# Patient Record
Sex: FEMALE | Race: WHITE | NOT HISPANIC OR LATINO | Employment: FULL TIME | URBAN - METROPOLITAN AREA
[De-identification: names, ages, dates, MRNs, and addresses within clinical notes are randomized per-mention and may not be internally consistent; named-entity substitution may affect disease eponyms.]

---

## 2017-12-19 ENCOUNTER — OFFICE VISIT (OUTPATIENT)
Dept: URGENT CARE | Facility: CLINIC | Age: 37
End: 2017-12-19
Payer: COMMERCIAL

## 2017-12-19 VITALS
DIASTOLIC BLOOD PRESSURE: 90 MMHG | HEIGHT: 63 IN | OXYGEN SATURATION: 98 % | WEIGHT: 140 LBS | SYSTOLIC BLOOD PRESSURE: 130 MMHG | TEMPERATURE: 97.6 F | RESPIRATION RATE: 16 BRPM | BODY MASS INDEX: 24.8 KG/M2 | HEART RATE: 72 BPM

## 2017-12-19 DIAGNOSIS — L03.012 PARONYCHIA OF LEFT INDEX FINGER: ICD-10-CM

## 2017-12-19 PROCEDURE — 99203 OFFICE O/P NEW LOW 30 MIN: CPT | Performed by: PHYSICIAN ASSISTANT

## 2017-12-19 RX ORDER — SULFAMETHOXAZOLE AND TRIMETHOPRIM 800; 160 MG/1; MG/1
1 TABLET ORAL EVERY 12 HOURS
Qty: 20 TAB | Refills: 0 | Status: SHIPPED | OUTPATIENT
Start: 2017-12-19 | End: 2017-12-29

## 2017-12-19 ASSESSMENT — ENCOUNTER SYMPTOMS
FEVER: 0
TINGLING: 0
VOMITING: 0
SENSORY CHANGE: 0
BRUISES/BLEEDS EASILY: 0
CHILLS: 0
NAUSEA: 0

## 2017-12-19 NOTE — PROGRESS NOTES
"  Subjective:     Ann Lugo is a 37 y.o. female who presents for Finger Pain (xsaturday, left index finger possible infection, swollen, redness)       Last 7-10d of mild swelling, redness and pressure pain to left index, adjacent to nail margin, PMH of using fake nails and freq chewing of base of nail. Notes this fake nail was removed just prior to onset s/sx. Denies fever/chills/drainage. Denies PMH of similar. States she has been diligent w/ warm epsom soaks but this still has worsening pain.        Other   This is a new problem. The current episode started in the past 7 days (3d). The problem has been gradually worsening. Pertinent negatives include no chills, fever, nausea, rash or vomiting.   History reviewed. No pertinent past medical history.History reviewed. No pertinent surgical history.  Social History     Social History   • Marital status:      Spouse name: N/A   • Number of children: N/A   • Years of education: N/A     Occupational History   • Not on file.     Social History Main Topics   • Smoking status: Never Smoker   • Smokeless tobacco: Never Used   • Alcohol use No      Comment: occass   • Drug use: No   • Sexual activity: Not on file     Other Topics Concern   • Not on file     Social History Narrative   • No narrative on file    History reviewed. No pertinent family history. Review of Systems   Constitutional: Negative for chills and fever.   Gastrointestinal: Negative for nausea and vomiting.   Musculoskeletal: Positive for joint pain ( pos for swelling and pain to left index tip).   Skin: Negative for rash.   Neurological: Negative for tingling and sensory change.   Endo/Heme/Allergies: Does not bruise/bleed easily.     Allergies   Allergen Reactions   • Nkda [No Known Drug Allergy]       Objective:   /90   Pulse 72   Temp 36.4 °C (97.6 °F)   Resp 16   Ht 1.6 m (5' 3\")   Wt 63.5 kg (140 lb)   SpO2 98%   BMI 24.80 kg/m²   Physical Exam   Constitutional: She is " oriented to person, place, and time. She appears well-developed and well-nourished. No distress.   HENT:   Head: Normocephalic and atraumatic.   Right Ear: External ear normal.   Left Ear: External ear normal.   Nose: Nose normal.   Eyes: Conjunctivae and lids are normal. Right eye exhibits no discharge. Left eye exhibits no discharge. No scleral icterus.   Neck: Neck supple.   Pulmonary/Chest: Effort normal. No respiratory distress.   Musculoskeletal: Normal range of motion.        Hands:  Ulnar aspect of left index nail margin w/ very mild erythema and edema, no fluctuance, non pointing, non ulcerative/vesicular, normal sensation c/w mild paronychia   Neurological: She is alert and oriented to person, place, and time. She is not disoriented.   Skin: Skin is warm and dry. She is not diaphoretic. No erythema. No pallor.   Psychiatric: Her speech is normal and behavior is normal.   Nursing note and vitals reviewed.        Assessment/Plan:   Assessment    1. Paronychia of left index finger  Supportive care is reviewed with patient/caregiver - I review (multiple times) w/ pt tx plan of continuing epsom soaks, this does not warrant I&D today w/ no focal cystic structure, if pressure pain is worsening despite 15min soaks 4-5 x / day, then fill and take abx,  take full course of Rx, take with probiotics, observe for resolution  Return to clinic with lack of resolution or progression of symptoms.    - sulfamethoxazole-trimethoprim (BACTRIM DS) 800-160 MG tablet; Take 1 Tab by mouth every 12 hours for 10 days.  Dispense: 20 Tab; Refill: 0

## 2018-10-22 ENCOUNTER — OFFICE VISIT (OUTPATIENT)
Dept: URGENT CARE | Facility: CLINIC | Age: 38
End: 2018-10-22
Payer: COMMERCIAL

## 2018-10-22 VITALS
TEMPERATURE: 99.7 F | DIASTOLIC BLOOD PRESSURE: 100 MMHG | WEIGHT: 140 LBS | HEART RATE: 68 BPM | BODY MASS INDEX: 24.8 KG/M2 | RESPIRATION RATE: 16 BRPM | OXYGEN SATURATION: 99 % | SYSTOLIC BLOOD PRESSURE: 130 MMHG | HEIGHT: 63 IN

## 2018-10-22 DIAGNOSIS — S61.201A OPEN WOUND OF LEFT INDEX FINGER WITHOUT DAMAGE TO NAIL, INITIAL ENCOUNTER: ICD-10-CM

## 2018-10-22 PROCEDURE — 12001 RPR S/N/AX/GEN/TRNK 2.5CM/<: CPT | Performed by: NURSE PRACTITIONER

## 2018-10-22 ASSESSMENT — ENCOUNTER SYMPTOMS
TINGLING: 0
SENSORY CHANGE: 0

## 2018-10-23 NOTE — PROGRESS NOTES
"Subjective:      Ann Lugo is a 38 y.o. female who presents with Laceration (index on left hand, today while cutting bread)            HPI New problem. 38 year old female with open wound to left index finger while slicing something at home. She has active bleeding from this area. No nail damage. NVS intact. Up to date on tetanus. No distal paresthesia.    Review of Systems   Skin:        Open wound finger.   Neurological: Negative for tingling and sensory change.          Objective:     /100 (BP Location: Left arm, Patient Position: Sitting, BP Cuff Size: Adult)   Pulse 68   Temp 37.6 °C (99.7 °F) (Temporal)   Resp 16   Ht 1.6 m (5' 3\")   Wt 63.5 kg (140 lb)   LMP 10/04/2018   SpO2 99%   Breastfeeding? No   BMI 24.80 kg/m²      Physical Exam   Constitutional: She is oriented to person, place, and time. She appears well-developed and well-nourished.   Musculoskeletal: Normal range of motion.   Neurological: She is alert and oriented to person, place, and time.   Skin: Skin is warm and dry.   approx one cm open wound to lateral tip of left 2nd digit.   Psychiatric: She has a normal mood and affect. Her behavior is normal. Thought content normal.   Nursing note and vitals reviewed.         Procedure: Laceration Repair  -Risks including bleeding, nerve damage, infection, and poor cosmetic outcome discussed at length. Benefits and alternatives discussed.   -Sterile technique throughout  -Local anesthesia with 2% lidocaine  -Closed with 2 #  4-0 Nylon interrupted sutures with good wound approximation  -Polysporin and dressing placed  -Patient tolerated well           Assessment/Plan:     1. Open wound of left index finger without damage to nail, initial encounter    RTC 7 days for removal  Wound care instructions to patient.  Ibuprofen 800 mg tid for pain.  Differential diagnosis, natural history, supportive care, and indications for immediate follow-up discussed at length.       "

## 2018-10-29 ENCOUNTER — OFFICE VISIT (OUTPATIENT)
Dept: URGENT CARE | Facility: CLINIC | Age: 38
End: 2018-10-29
Payer: COMMERCIAL

## 2018-10-29 VITALS
RESPIRATION RATE: 16 BRPM | HEART RATE: 62 BPM | BODY MASS INDEX: 24.8 KG/M2 | HEIGHT: 63 IN | DIASTOLIC BLOOD PRESSURE: 80 MMHG | SYSTOLIC BLOOD PRESSURE: 110 MMHG | TEMPERATURE: 97.5 F | OXYGEN SATURATION: 98 % | WEIGHT: 140 LBS

## 2018-10-29 DIAGNOSIS — Z48.02 VISIT FOR SUTURE REMOVAL: ICD-10-CM

## 2018-10-29 PROCEDURE — 99024 POSTOP FOLLOW-UP VISIT: CPT | Performed by: PHYSICIAN ASSISTANT

## 2018-10-30 NOTE — PROGRESS NOTES
"Subjective:      Ann Lugo is a 38 y.o. female who presents with Suture / Staple Removal (x 2 days, suture removal from Lt. index)          Pt is 39 y/o female who presents to  for suture removal- place on 10/22.       Suture / Staple Removal   Treated in ED: 10/22. There has been no drainage from the wound. There is no redness present. There is no swelling present. There is no pain present.       ROS    Denies any drainage, fevers, chills, or finger pain.   Also denies any numbness or tingling.      Objective:     /80 (BP Location: Left arm, Patient Position: Sitting, BP Cuff Size: Adult)   Pulse 62   Temp 36.4 °C (97.5 °F) (Temporal)   Resp 16   Ht 1.6 m (5' 3\")   Wt 63.5 kg (140 lb)   LMP 10/04/2018   SpO2 98%   BMI 24.80 kg/m²      Physical Exam       Left index finger- 2 sutures intact- well healing, approximated well. Without tenderness, noted edema, or swelling.        Assessment/Plan:     1. Visit for suture removal       Sutures removed by MA- without difficulty.   RTC PRN. Further wound care was discussed.     "

## 2019-11-20 ENCOUNTER — HOSPITAL ENCOUNTER (INPATIENT)
Facility: MEDICAL CENTER | Age: 39
LOS: 2 days | End: 2019-11-22
Attending: OBSTETRICS & GYNECOLOGY | Admitting: OBSTETRICS & GYNECOLOGY
Payer: COMMERCIAL

## 2019-11-20 ENCOUNTER — ANESTHESIA (OUTPATIENT)
Dept: ANESTHESIOLOGY | Facility: MEDICAL CENTER | Age: 39
End: 2019-11-20
Payer: COMMERCIAL

## 2019-11-20 ENCOUNTER — ANESTHESIA EVENT (OUTPATIENT)
Dept: ANESTHESIOLOGY | Facility: MEDICAL CENTER | Age: 39
End: 2019-11-20
Payer: COMMERCIAL

## 2019-11-20 LAB
ALBUMIN SERPL BCP-MCNC: 3.8 G/DL (ref 3.2–4.9)
ALBUMIN/GLOB SERPL: 1.3 G/DL
ALP SERPL-CCNC: 113 U/L (ref 30–99)
ALT SERPL-CCNC: 18 U/L (ref 2–50)
ANION GAP SERPL CALC-SCNC: 10 MMOL/L (ref 0–11.9)
AST SERPL-CCNC: 21 U/L (ref 12–45)
BASOPHILS # BLD AUTO: 0.3 % (ref 0–1.8)
BASOPHILS # BLD: 0.03 K/UL (ref 0–0.12)
BILIRUB SERPL-MCNC: 0.4 MG/DL (ref 0.1–1.5)
BUN SERPL-MCNC: 13 MG/DL (ref 8–22)
CALCIUM SERPL-MCNC: 9.2 MG/DL (ref 8.5–10.5)
CHLORIDE SERPL-SCNC: 103 MMOL/L (ref 96–112)
CO2 SERPL-SCNC: 23 MMOL/L (ref 20–33)
CREAT SERPL-MCNC: 0.79 MG/DL (ref 0.5–1.4)
EOSINOPHIL # BLD AUTO: 0.08 K/UL (ref 0–0.51)
EOSINOPHIL NFR BLD: 0.7 % (ref 0–6.9)
ERYTHROCYTE [DISTWIDTH] IN BLOOD BY AUTOMATED COUNT: 45.4 FL (ref 35.9–50)
ERYTHROCYTE [DISTWIDTH] IN BLOOD BY AUTOMATED COUNT: 45.6 FL (ref 35.9–50)
GLOBULIN SER CALC-MCNC: 2.9 G/DL (ref 1.9–3.5)
GLUCOSE SERPL-MCNC: 90 MG/DL (ref 65–99)
HCT VFR BLD AUTO: 36.4 % (ref 37–47)
HCT VFR BLD AUTO: 41.7 % (ref 37–47)
HGB BLD-MCNC: 12.6 G/DL (ref 12–16)
HGB BLD-MCNC: 14.2 G/DL (ref 12–16)
HOLDING TUBE BB 8507: NORMAL
IMM GRANULOCYTES # BLD AUTO: 0.07 K/UL (ref 0–0.11)
IMM GRANULOCYTES NFR BLD AUTO: 0.6 % (ref 0–0.9)
LYMPHOCYTES # BLD AUTO: 2.47 K/UL (ref 1–4.8)
LYMPHOCYTES NFR BLD: 22 % (ref 22–41)
MCH RBC QN AUTO: 32.1 PG (ref 27–33)
MCH RBC QN AUTO: 32.7 PG (ref 27–33)
MCHC RBC AUTO-ENTMCNC: 34.1 G/DL (ref 33.6–35)
MCHC RBC AUTO-ENTMCNC: 34.6 G/DL (ref 33.6–35)
MCV RBC AUTO: 94.3 FL (ref 81.4–97.8)
MCV RBC AUTO: 94.5 FL (ref 81.4–97.8)
MONOCYTES # BLD AUTO: 0.73 K/UL (ref 0–0.85)
MONOCYTES NFR BLD AUTO: 6.5 % (ref 0–13.4)
NEUTROPHILS # BLD AUTO: 7.83 K/UL (ref 2–7.15)
NEUTROPHILS NFR BLD: 69.9 % (ref 44–72)
NRBC # BLD AUTO: 0 K/UL
NRBC BLD-RTO: 0 /100 WBC
PLATELET # BLD AUTO: 137 K/UL (ref 164–446)
PLATELET # BLD AUTO: 178 K/UL (ref 164–446)
PMV BLD AUTO: 10.3 FL (ref 9–12.9)
PMV BLD AUTO: 10.7 FL (ref 9–12.9)
POTASSIUM SERPL-SCNC: 3.7 MMOL/L (ref 3.6–5.5)
PROT SERPL-MCNC: 6.7 G/DL (ref 6–8.2)
RBC # BLD AUTO: 3.85 M/UL (ref 4.2–5.4)
RBC # BLD AUTO: 4.42 M/UL (ref 4.2–5.4)
SODIUM SERPL-SCNC: 136 MMOL/L (ref 135–145)
WBC # BLD AUTO: 11.2 K/UL (ref 4.8–10.8)
WBC # BLD AUTO: 11.5 K/UL (ref 4.8–10.8)

## 2019-11-20 PROCEDURE — 303615 HCHG EPIDURAL/SPINAL ANESTHESIA FOR LABOR

## 2019-11-20 PROCEDURE — 0KQM0ZZ REPAIR PERINEUM MUSCLE, OPEN APPROACH: ICD-10-PCS | Performed by: OBSTETRICS & GYNECOLOGY

## 2019-11-20 PROCEDURE — 700111 HCHG RX REV CODE 636 W/ 250 OVERRIDE (IP)

## 2019-11-20 PROCEDURE — 700111 HCHG RX REV CODE 636 W/ 250 OVERRIDE (IP): Performed by: OBSTETRICS & GYNECOLOGY

## 2019-11-20 PROCEDURE — 700105 HCHG RX REV CODE 258: Performed by: OBSTETRICS & GYNECOLOGY

## 2019-11-20 PROCEDURE — 85025 COMPLETE CBC W/AUTO DIFF WBC: CPT

## 2019-11-20 PROCEDURE — 770002 HCHG ROOM/CARE - OB PRIVATE (112)

## 2019-11-20 PROCEDURE — A9270 NON-COVERED ITEM OR SERVICE: HCPCS | Performed by: OBSTETRICS & GYNECOLOGY

## 2019-11-20 PROCEDURE — 36415 COLL VENOUS BLD VENIPUNCTURE: CPT

## 2019-11-20 PROCEDURE — 85027 COMPLETE CBC AUTOMATED: CPT

## 2019-11-20 PROCEDURE — 700102 HCHG RX REV CODE 250 W/ 637 OVERRIDE(OP): Performed by: OBSTETRICS & GYNECOLOGY

## 2019-11-20 PROCEDURE — 80053 COMPREHEN METABOLIC PANEL: CPT

## 2019-11-20 PROCEDURE — 304965 HCHG RECOVERY SERVICES

## 2019-11-20 PROCEDURE — 59409 OBSTETRICAL CARE: CPT

## 2019-11-20 RX ORDER — ALUMINA, MAGNESIA, AND SIMETHICONE 2400; 2400; 240 MG/30ML; MG/30ML; MG/30ML
30 SUSPENSION ORAL EVERY 6 HOURS PRN
Status: DISCONTINUED | OUTPATIENT
Start: 2019-11-20 | End: 2019-11-20 | Stop reason: HOSPADM

## 2019-11-20 RX ORDER — ONDANSETRON 2 MG/ML
4 INJECTION INTRAMUSCULAR; INTRAVENOUS EVERY 6 HOURS PRN
Status: DISCONTINUED | OUTPATIENT
Start: 2019-11-20 | End: 2019-11-22 | Stop reason: HOSPADM

## 2019-11-20 RX ORDER — VITAMIN A ACETATE, BETA CAROTENE, ASCORBIC ACID, CHOLECALCIFEROL, .ALPHA.-TOCOPHEROL ACETATE, DL-, THIAMINE MONONITRATE, RIBOFLAVIN, NIACINAMIDE, PYRIDOXINE HYDROCHLORIDE, FOLIC ACID, CYANOCOBALAMIN, CALCIUM CARBONATE, FERROUS FUMARATE, ZINC OXIDE, CUPRIC OXIDE 3080; 12; 120; 400; 1; 1.84; 3; 20; 22; 920; 25; 200; 27; 10; 2 [IU]/1; UG/1; MG/1; [IU]/1; MG/1; MG/1; MG/1; MG/1; MG/1; [IU]/1; MG/1; MG/1; MG/1; MG/1; MG/1
1 TABLET, FILM COATED ORAL EVERY MORNING
Status: DISCONTINUED | OUTPATIENT
Start: 2019-11-21 | End: 2019-11-22 | Stop reason: HOSPADM

## 2019-11-20 RX ORDER — IBUPROFEN 600 MG/1
600 TABLET ORAL EVERY 6 HOURS PRN
Status: CANCELLED | OUTPATIENT
Start: 2019-11-20

## 2019-11-20 RX ORDER — ROPIVACAINE HYDROCHLORIDE 2 MG/ML
INJECTION, SOLUTION EPIDURAL; INFILTRATION; PERINEURAL CONTINUOUS
Status: DISCONTINUED | OUTPATIENT
Start: 2019-11-20 | End: 2019-11-20

## 2019-11-20 RX ORDER — OXYCODONE HYDROCHLORIDE AND ACETAMINOPHEN 5; 325 MG/1; MG/1
1 TABLET ORAL EVERY 4 HOURS PRN
Status: DISCONTINUED | OUTPATIENT
Start: 2019-11-20 | End: 2019-11-22 | Stop reason: HOSPADM

## 2019-11-20 RX ORDER — ROPIVACAINE HYDROCHLORIDE 2 MG/ML
INJECTION, SOLUTION EPIDURAL; INFILTRATION; PERINEURAL
Status: COMPLETED
Start: 2019-11-20 | End: 2019-11-20

## 2019-11-20 RX ORDER — SODIUM CHLORIDE, SODIUM LACTATE, POTASSIUM CHLORIDE, CALCIUM CHLORIDE 600; 310; 30; 20 MG/100ML; MG/100ML; MG/100ML; MG/100ML
INJECTION, SOLUTION INTRAVENOUS PRN
Status: DISCONTINUED | OUTPATIENT
Start: 2019-11-20 | End: 2019-11-22 | Stop reason: HOSPADM

## 2019-11-20 RX ORDER — DOCUSATE SODIUM 100 MG/1
100 CAPSULE, LIQUID FILLED ORAL 2 TIMES DAILY PRN
Status: DISCONTINUED | OUTPATIENT
Start: 2019-11-20 | End: 2019-11-22 | Stop reason: HOSPADM

## 2019-11-20 RX ORDER — SODIUM CHLORIDE, SODIUM LACTATE, POTASSIUM CHLORIDE, CALCIUM CHLORIDE 600; 310; 30; 20 MG/100ML; MG/100ML; MG/100ML; MG/100ML
INJECTION, SOLUTION INTRAVENOUS CONTINUOUS
Status: DISCONTINUED | OUTPATIENT
Start: 2019-11-20 | End: 2019-11-20

## 2019-11-20 RX ORDER — SODIUM CHLORIDE, SODIUM LACTATE, POTASSIUM CHLORIDE, AND CALCIUM CHLORIDE .6; .31; .03; .02 G/100ML; G/100ML; G/100ML; G/100ML
1000 INJECTION, SOLUTION INTRAVENOUS
Status: DISCONTINUED | OUTPATIENT
Start: 2019-11-20 | End: 2019-11-20 | Stop reason: HOSPADM

## 2019-11-20 RX ORDER — ONDANSETRON 4 MG/1
4 TABLET, ORALLY DISINTEGRATING ORAL EVERY 6 HOURS PRN
Status: DISCONTINUED | OUTPATIENT
Start: 2019-11-20 | End: 2019-11-22 | Stop reason: HOSPADM

## 2019-11-20 RX ORDER — IBUPROFEN 600 MG/1
600 TABLET ORAL EVERY 6 HOURS PRN
Status: DISCONTINUED | OUTPATIENT
Start: 2019-11-20 | End: 2019-11-22 | Stop reason: HOSPADM

## 2019-11-20 RX ORDER — MISOPROSTOL 200 UG/1
600 TABLET ORAL
Status: DISCONTINUED | OUTPATIENT
Start: 2019-11-20 | End: 2019-11-22 | Stop reason: HOSPADM

## 2019-11-20 RX ORDER — DEXTROSE, SODIUM CHLORIDE, SODIUM LACTATE, POTASSIUM CHLORIDE, AND CALCIUM CHLORIDE 5; .6; .31; .03; .02 G/100ML; G/100ML; G/100ML; G/100ML; G/100ML
INJECTION, SOLUTION INTRAVENOUS CONTINUOUS
Status: DISCONTINUED | OUTPATIENT
Start: 2019-11-20 | End: 2019-11-20

## 2019-11-20 RX ORDER — MISOPROSTOL 200 UG/1
800 TABLET ORAL
Status: DISCONTINUED | OUTPATIENT
Start: 2019-11-20 | End: 2019-11-20 | Stop reason: HOSPADM

## 2019-11-20 RX ORDER — OXYCODONE AND ACETAMINOPHEN 10; 325 MG/1; MG/1
1 TABLET ORAL EVERY 4 HOURS PRN
Status: DISCONTINUED | OUTPATIENT
Start: 2019-11-20 | End: 2019-11-22 | Stop reason: HOSPADM

## 2019-11-20 RX ORDER — OXYCODONE HYDROCHLORIDE AND ACETAMINOPHEN 5; 325 MG/1; MG/1
1 TABLET ORAL EVERY 4 HOURS PRN
Status: CANCELLED | OUTPATIENT
Start: 2019-11-20

## 2019-11-20 RX ORDER — SODIUM CHLORIDE, SODIUM LACTATE, POTASSIUM CHLORIDE, AND CALCIUM CHLORIDE .6; .31; .03; .02 G/100ML; G/100ML; G/100ML; G/100ML
250 INJECTION, SOLUTION INTRAVENOUS PRN
Status: DISCONTINUED | OUTPATIENT
Start: 2019-11-20 | End: 2019-11-20 | Stop reason: HOSPADM

## 2019-11-20 RX ADMIN — ROPIVACAINE HYDROCHLORIDE 200 MG: 2 INJECTION, SOLUTION EPIDURAL; INFILTRATION; PERINEURAL at 07:26

## 2019-11-20 RX ADMIN — Medication 125 ML/HR: at 10:04

## 2019-11-20 RX ADMIN — Medication 1000 ML/HR: at 08:50

## 2019-11-20 RX ADMIN — SODIUM CHLORIDE, POTASSIUM CHLORIDE, SODIUM LACTATE AND CALCIUM CHLORIDE: 600; 310; 30; 20 INJECTION, SOLUTION INTRAVENOUS at 07:44

## 2019-11-20 RX ADMIN — IBUPROFEN 600 MG: 600 TABLET ORAL at 20:55

## 2019-11-20 RX ADMIN — Medication 2 MILLI-UNITS/MIN: at 05:00

## 2019-11-20 RX ADMIN — SODIUM CHLORIDE, POTASSIUM CHLORIDE, SODIUM LACTATE AND CALCIUM CHLORIDE: 600; 310; 30; 20 INJECTION, SOLUTION INTRAVENOUS at 05:00

## 2019-11-20 RX ADMIN — IBUPROFEN 600 MG: 600 TABLET ORAL at 11:29

## 2019-11-20 RX ADMIN — ROPIVACAINE HYDROCHLORIDE 200 MG: 2 INJECTION, SOLUTION EPIDURAL; INFILTRATION at 07:26

## 2019-11-20 ASSESSMENT — PATIENT HEALTH QUESTIONNAIRE - PHQ9
SUM OF ALL RESPONSES TO PHQ9 QUESTIONS 1 AND 2: 0
2. FEELING DOWN, DEPRESSED, IRRITABLE, OR HOPELESS: NOT AT ALL
1. LITTLE INTEREST OR PLEASURE IN DOING THINGS: NOT AT ALL

## 2019-11-20 ASSESSMENT — LIFESTYLE VARIABLES
ALCOHOL_USE: NO
EVER FELT BAD OR GUILTY ABOUT YOUR DRINKING: NO
DOES PATIENT WANT TO STOP DRINKING: NO
EVER HAD A DRINK FIRST THING IN THE MORNING TO STEADY YOUR NERVES TO GET RID OF A HANGOVER: NO
AVERAGE NUMBER OF DAYS PER WEEK YOU HAVE A DRINK CONTAINING ALCOHOL: 0
TOTAL SCORE: 0
CONSUMPTION TOTAL: NEGATIVE
HOW MANY TIMES IN THE PAST YEAR HAVE YOU HAD 5 OR MORE DRINKS IN A DAY: 0
EVER_SMOKED: NEVER
TOTAL SCORE: 0
TOTAL SCORE: 0
HAVE PEOPLE ANNOYED YOU BY CRITICIZING YOUR DRINKING: NO
HAVE YOU EVER FELT YOU SHOULD CUT DOWN ON YOUR DRINKING: NO
ON A TYPICAL DAY WHEN YOU DRINK ALCOHOL HOW MANY DRINKS DO YOU HAVE: 0

## 2019-11-20 ASSESSMENT — COPD QUESTIONNAIRES
IN THE PAST 12 MONTHS DO YOU DO LESS THAN YOU USED TO BECAUSE OF YOUR BREATHING PROBLEMS: DISAGREE/UNSURE
DO YOU EVER COUGH UP ANY MUCUS OR PHLEGM?: NO/ONLY WITH OCCASIONAL COLDS OR INFECTIONS
DURING THE PAST 4 WEEKS HOW MUCH DID YOU FEEL SHORT OF BREATH: NONE/LITTLE OF THE TIME

## 2019-11-20 ASSESSMENT — PAIN SCALES - GENERAL: PAIN_LEVEL: 0

## 2019-11-20 NOTE — ANESTHESIA PROCEDURE NOTES
Epidural Block  Date/Time: 11/20/2019 7:26 AM  Performed by: Hank Madsen M.D.  Authorized by: Hank Madsen M.D.     Patient Location:  OB  Start Time:  11/20/2019 7:26 AM  End Time:  11/20/2019 7:28 AM  Reason for Block: labor analgesia    patient identified, IV checked, site marked, risks and benefits discussed, surgical consent, monitors and equipment checked, pre-op evaluation and timeout performed    Patient Position:  Sitting  Prep: ChloraPrep, patient draped and sterile technique    Monitoring:  Blood pressure, continuous pulse oximetry and heart rate  Approach:  Midline  Location:  L3-L4  Injection Technique:  EDMUNDO saline  Skin infiltration:  Lidocaine  Strength:  1%  Dose:  5ml  Needle Type:  Tuohy  Needle Gauge:  17 G  Needle Length:  3.5 in  Loss of resistance::  4  Catheter Size:  19 G  Catheter at Skin Depth:  9  Test Dose:  Lidocaine 1.5% with epinephrine 1-to-200,000  Test Dose Result:  Negative

## 2019-11-20 NOTE — ANESTHESIA POSTPROCEDURE EVALUATION
Patient: Ann Lugo    Procedure Summary     Date:  11/20/19 Room / Location:      Anesthesia Start:  0720 Anesthesia Stop:  0846    Procedure:  Labor Epidural Diagnosis:      Scheduled Providers:   Responsible Provider:  Hank Madsen M.D.    Anesthesia Type:  epidural ASA Status:  2          Final Anesthesia Type: epidural  Last vitals  BP   Blood Pressure: 122/78    Temp   37.2 °C (98.9 °F)    Pulse   Pulse: 69   Resp   18    SpO2   95 %      Anesthesia Post Evaluation    Patient location during evaluation: PACU  Patient participation: complete - patient participated  Level of consciousness: awake and alert  Pain score: 0    Airway patency: patent  Anesthetic complications: no  Cardiovascular status: hemodynamically stable  Respiratory status: acceptable  Hydration status: euvolemic    PONV: none

## 2019-11-20 NOTE — PROGRESS NOTES
"0700: Bedside report received from VIVEK Bran RN. Pt requesting epidural at this time, LR bolus started.     0723: Dr. Madsen at bedside to place epidural.     0734: SVE 4-5/100/0    0829: Pt states, \"I need to push.\" SVE Complete and +1 station.     0835: Dr. Sanabria updated with patient labor status.     0840: Dr. Sanabria at bedside for delivery.     0841: Pt began pushing.    0846: Infant delivered by Dr. Sanabria with 8/9 Apgars. 2 nd degree laceration noted with repair, and  mL.     0900: Straight catheter placed at this time, 150 mL urine output.     1120: Pt ambulated to bathroom at this time with the stand-by assistance of this RN. Pt voided large amount, joanie care done, tucks, pad and underwear in place.     1145: Pt transferred to S326 postpartum via wheelchair, report given to ARTUR Vásquez.         "

## 2019-11-20 NOTE — ANESTHESIA PREPROCEDURE EVALUATION
Term IUP.  Labor pain.   Relevant Problems   No relevant active problems     Vitals:    19 0459   BP: 134/73   Pulse: 75   Temp:      Recent Labs     19  0300   WBC 11.2*   RBC 4.42   HEMOGLOBIN 14.2   HEMATOCRIT 41.7   MCV 94.3   MCH 32.1   RDW 45.6   PLATELETCT 178   MPV 10.7   NEUTSPOLYS 69.90   LYMPHOCYTES 22.00   MONOCYTES 6.50   EOSINOPHILS 0.70   BASOPHILS 0.30       Physical Exam    Airway   Mallampati: II  TM distance: >3 FB  Neck ROM: full       Cardiovascular - normal exam  Rhythm: regular  Rate: normal  (-) murmur     Dental - normal exam         Pulmonary - normal exam  Breath sounds clear to auscultation     Abdominal    Neurological - normal exam                 Anesthesia Plan    ASA 2       Plan - epidural   Neuraxial block will be labor analgesia              Pertinent diagnostic labs and testing reviewed    Informed Consent:    Anesthetic plan and risks discussed with patient.

## 2019-11-20 NOTE — CARE PLAN
Problem: Altered physiologic condition related to immediate post-delivery state and potential for bleeding/hemorrhage  Goal: Patient physiologically stable as evidenced by normal lochia, palpable uterine involution and vital signs within normal limits  Outcome: PROGRESSING AS EXPECTED     Problem: Alteration in comfort related to episiotomy, vaginal repair and/or after birth pains  Goal: Patient verbalizes acceptable pain level  Outcome: PROGRESSING AS EXPECTED

## 2019-11-20 NOTE — H&P
DATE OF SERVICE:  2019    IDENTIFICATION:  This is a 39-year-old  3, para 2 female, EDC ,   which makes her 39 weeks who was admitted with spontaneous rupture of   membranes.    HISTORY OF PRESENT ILLNESS:  The patient has been followed by Dr. Hill.    Pregnancy has been uncomplicated.  She did have an NIPT testing that was   normal.  She had a normal anatomical scan and she is expecting a female. Her   group B strep status is negative.  She presented this evening complaining of   leaking of fluid since 11:00 p.m.  This was confirmed with sterile speculum   exam and she is admitted.  She is now on Pitocin feeling some of her   contractions, but overall still comfortable.  She is planning on an epidural   for pain control.    PAST MEDICAL HISTORY:  Migraines.    PAST SURGICAL HISTORY:  None.    ALLERGIES:  EYE DROPS.    FAMILY HISTORY:  Breast cancer and heart disease.    SOCIAL HISTORY:  She is .  She denies use of tobacco, alcohol or drugs.    PREVIOUS OBSTETRICAL HISTORY:  Two previous full-term vaginal deliveries.    PHYSICAL EXAMINATION:  VITAL SIGNS:  She is afebrile.  GENERAL:  She is pleasant, cooperative and appears her stated age.  ABDOMEN:  Soft, gravid, Leopold's approximately 7 pounds.  GENITOURINARY:  Sterile vaginal exam, 1-2, 50% effaced, -2 station.  Fetal   heart tracings category 1.  Tocometry reveals contractions every 2-3 minutes.    ASSESSMENT AND PLAN:  A 39-year-old  3, para 2 female at 39 weeks   gestation here with ruptured membranes, Group B Streptococcus negative status,   who is now on Pitocin.  She is sully.  She is planning on an epidural.       ____________________________________     MD BERTHA Cohen / GRIS    DD:  2019 05:52:43  DT:  2019 08:24:24    D#:  [id number]  Job#:  [id number]

## 2019-11-20 NOTE — ANESTHESIA TIME REPORT
Anesthesia Start and Stop Event Times     Date Time Event    11/20/2019 0715 Ready for Procedure     0720 Anesthesia Start     0846 Anesthesia Stop        Responsible Staff  11/20/19    Name Role Begin End    Hank Madsen M.D. Anesth 0720 0846        Preop Diagnosis (Free Text):  Pre-op Diagnosis             Preop Diagnosis (Codes):    Post op Diagnosis  Pain during labor      Premium Reason  Non-Premium    Comments:

## 2019-11-21 PROCEDURE — 770002 HCHG ROOM/CARE - OB PRIVATE (112)

## 2019-11-21 PROCEDURE — 700102 HCHG RX REV CODE 250 W/ 637 OVERRIDE(OP): Performed by: OBSTETRICS & GYNECOLOGY

## 2019-11-21 PROCEDURE — A9270 NON-COVERED ITEM OR SERVICE: HCPCS | Performed by: OBSTETRICS & GYNECOLOGY

## 2019-11-21 RX ORDER — IBUPROFEN 600 MG/1
600 TABLET ORAL EVERY 6 HOURS PRN
Qty: 30 TAB | Refills: 1 | Status: SHIPPED | OUTPATIENT
Start: 2019-11-21

## 2019-11-21 RX ADMIN — VITAMIN A, VITAMIN C, VITAMIN D-3, VITAMIN E, VITAMIN B-1, VITAMIN B-2, NIACIN, VITAMIN B-6, CALCIUM, IRON, ZINC, COPPER 1 TABLET: 4000; 120; 400; 22; 1.84; 3; 20; 10; 1; 12; 200; 27; 25; 2 TABLET ORAL at 06:20

## 2019-11-21 ASSESSMENT — EDINBURGH POSTNATAL DEPRESSION SCALE (EPDS)
I HAVE FELT SCARED OR PANICKY FOR NO GOOD REASON: NO, NOT AT ALL
I HAVE BEEN SO UNHAPPY THAT I HAVE HAD DIFFICULTY SLEEPING: NOT AT ALL
I HAVE BLAMED MYSELF UNNECESSARILY WHEN THINGS WENT WRONG: NOT VERY OFTEN
THE THOUGHT OF HARMING MYSELF HAS OCCURRED TO ME: NEVER
I HAVE LOOKED FORWARD WITH ENJOYMENT TO THINGS: AS MUCH AS I EVER DID
I HAVE FELT SAD OR MISERABLE: NO, NOT AT ALL
I HAVE BEEN ANXIOUS OR WORRIED FOR NO GOOD REASON: YES, SOMETIMES
THINGS HAVE BEEN GETTING ON TOP OF ME: NO, I HAVE BEEN COPING AS WELL AS EVER
I HAVE BEEN SO UNHAPPY THAT I HAVE BEEN CRYING: NO, NEVER
I HAVE BEEN ABLE TO LAUGH AND SEE THE FUNNY SIDE OF THINGS: AS MUCH AS I ALWAYS COULD

## 2019-11-21 NOTE — PROGRESS NOTES
Assessment completed, fundus firm, lochia light and rubra.   POC reviewed, verbalized understanding. Aware of plan for discharge home today and agrees with the plan.  Breastfeeding with some difficulty, being seen by lactation.  Denies pain at this time, will call if pain med intervention needed.

## 2019-11-21 NOTE — L&D DELIVERY NOTE
DATE OF SERVICE:  2019    DELIVERY NOTE    IDENTIFICATION:  This is a 39-year-old  3, para 2 with an EDC of   2019 who presents at 39 weeks complaining of spontaneous rupture of   membranes, clear.    HISTORY OF PRESENT ILLNESS:  Dr. Hill's patient, has gotten good prenatal   care.  Prenatal care has been uncomplicated.  She presents this morning   complaining of spontaneous rupture of membranes, clear, at approximately 11:00   p.m. last night.  She is known beta strep negative.  Her cervix was 1-2, 50,   and -2.  She was subsequently admitted, and I started her on Pitocin.  She   received an epidural for pain control, progressed over normal labor curve to   complete with an overall reassuring fetal heart tracing.  At complete, she was   able to push the baby down to a +3 station, extend the baby's head and   deliver atraumatically.  Nares and mouth were bulb suctioned.  There was no   nuchal cord.  The shoulders and body followed without complication.  After   delay, the cord was clamped and cut.  Cord gases were not collected.  Infant   was handed off to awaiting nursing team.  At this point, the placenta   delivered intact, 3-vessel cord.  The uterus firmed up nicely after 20 units   of Pitocin.  The cervix was intact.  There was a small second-degree midline   laceration, which was repaired with 3-0 Monocryl running locking above the   hymenal ring, running below the hymenal ring was subcuticular stitch back up   to the hymenal ring.  Estimated blood loss was 200 mL  The patient and baby to   recovery in stable condition.    FINDINGS:  Include a female infant with Apgars of 8 and 9.  There were no   complications.       ____________________________________     MD DERRICK Jalloh / GRIS    DD:  2019 09:02:35  DT:  2019 19:36:34    D#:  [id number]  Job#:  [id number]

## 2019-11-21 NOTE — DISCHARGE INSTRUCTIONS
POSTPARTUM DISCHARGE INSTRUCTIONS FOR MOM    YOB: 1980   Age: 39 y.o.               Admit Date: 11/20/2019     Discharge Date: 11/21/2019  Attending Doctor:  Emma Hill M.D.                  Allergies:  Patient has no known allergies.    Discharged to home by car. Discharged via wheelchair, hospital escort: Yes.  Special equipment needed: Not Applicable  Belongings with: Personal  Be sure to schedule a follow-up appointment with your primary care doctor or any specialists as instructed.     Discharge Plan:   Diet Plan: Discussed  Activity Level: Discussed  Confirmed Follow up Appointment: Patient to Call and Schedule Appointment  Confirmed Symptoms Management: Discussed  Medication Reconciliation Updated: Yes  Influenza Vaccine Indication: Patient Refuses    REASONS TO CALL YOUR OBSTETRICIAN:  1.   Persistent fever or shaking chills (Temperature higher than 100.4)  2.   Heavy bleeding (soaking more than 1 pad per hour); Passing clots  3.   Foul odor from vagina  4.   Mastitis (Breast infection; breast pain, chills, fever, redness)  5.   Urinary pain, burning or frequency  6.   Episiotomy infection  7.   Abdominal incision infection  8.   Severe depression longer than 24 hours    HAND WASHING  · Prior to handling the baby.  · Before breastfeeding or bottle feeding baby.  · After using the bathroom or changing the baby's diaper.      VAGINAL CARE  · Nothing inside vagina for 6 weeks: no sexual intercourse, tampons or douching.  · Bleeding may continue for 2-4 weeks.  Amount may vary.    · Call your physician for heavy bleeding which means soaking more than 1 pad per hour    BIRTH CONTROL  · It is possible to become pregnant at any time after delivery and while breastfeeding.  · Plan to discuss a method of birth control with your physician at your follow up visit. visit.    DIET AND ELIMINATION  · Eating more fiber (bran cereal, fruits, and vegetables) and drinking plenty of fluids will help to avoid  "constipation.  · Urinary frequency after childbirth is normal.    POSTPARTUM BLUES  During the first few days after birth, you may experience a sense of the \"blues\" which may include impatience, irritability or even crying.  These feeling come and go quickly.  However, as many as 1 in 10 women experience emotional symptoms known as postpartum depression.    Postpartum depression:  May start as early as the second or third day after delivery or take several weeks or months to develop.  Symptoms of \"blues\" are present, but are more intense:  Crying spells; loss of appetite; feelings of hopelessness or loss of control; fear of touching the baby; over concern or no concern at all about the baby; little or no concern about your own appearance/caring for yourself; and/or inability to sleep or excessive sleeping.  Contact your physician if you are experiencing any of these symptoms.    Crisis Hotline:  · [place] Crisis Hotline:  9-809-EYUBRPB  Or 1-926.846.5714  · [place] Crisis Hotline:  1-373.997.3161  Or 192-167-2204    PREVENTING SHAKEN BABY:  If you are angry or stressed, PUT THE BABY IN THE CRIB, step away, take some deep breaths, and wait until you are calm to care for the baby.  DO NOT SHAKE THE BABY.  You are not alone, call a supporter for help.    · Crisis Call Center 24/7 crisis line 911-742-8643 or 1-865.941.9458  · You can also text them, text \"ANSWER\" to 261888    QUIT SMOKING/TOBACCO USE:  I understand the use of any tobacco products increases my chance of suffering from future heart disease and could cause other illnesses which may shorten my life. Quitting the use of tobacco products is the single most important thing I can do to improve my health. For further information on smoking / tobacco cessation call a Toll Free Quit Line at 1-101.191.1607 (*[employer] [place]) or 1-206.307.6253 (American Lung Association) or you can access the web based program at www.lungusa.org.    · [place] Tobacco Users " Help Line:  (562) 882-5267       Toll Free: 1-371.192.2477  · Quit Tobacco Program [facility] (459)707-3175    DEPRESSION / SUICIDE RISK:  As you are discharged from this [facility], it is important to learn how to keep safe from harming yourself.    Recognize the warning signs:  · Abrupt changes in personality, positive or negative- including increase in energy   · Giving away possessions  · Change in eating patterns- significant weight changes-  positive or negative  · Change in sleeping patterns- unable to sleep or sleeping all the time   · Unwillingness or inability to communicate  · Depression  · Unusual sadness, discouragement and loneliness  · Talk of wanting to die  · Neglect of personal appearance   · Rebelliousness- reckless behavior  · Withdrawal from people/activities they love  · Confusion- inability to concentrate     If you or a loved one observes any of these behaviors or has concerns about self-harm, here's what you can do:  · Talk about it- your feelings and reasons for harming yourself  · Remove any means that you might use to hurt yourself (examples: pills, rope, extension cords, firearm)  · Get professional help from the community (Mental Health, Substance Abuse, psychological counseling)  · Do not be alone:Call your Safe Contact- someone whom you trust who will be there for you.  · Call your local CRISIS HOTLINE 014-9244 or 932-323-5777  · Call your local Children's Mobile Crisis Response Team [place] (399) 984-6428 or www.[employer]actics  · Call the toll free National Suicide Prevention Hotlines   · National Suicide Prevention Lifeline 739-771-KBLL (1978)  · National Hope Line Network 800-SUICIDE (792-6911)    DISCHARGE SURVEY:  Thank you for choosing [facility].  We hope we provided you with very good care.  You may be receiving a survey in the mail.  Please fill it out.  Your opinion is valuable to us.    ADDITIONAL EDUCATIONAL MATERIALS GIVEN TO  PATIENT:        My signature on this form indicates that:  1.  I have reviewed and understand the above information  2.  My questions regarding this information have been answered to my satisfaction.  3.  I have formulated a plan with my discharge nurse to obtain my prescribed medication for home.

## 2019-11-21 NOTE — CARE PLAN
Problem: Potential knowledge deficit related to lack of understanding of self and  care  Goal: Patient will verbalize understanding of self and infant care  Outcome: PROGRESSING AS EXPECTED  Goal: Patient will demonstrate ability to care for self and infant  Outcome: PROGRESSING AS EXPECTED     Problem: Pain Management  Goal: Pain level will decrease to patient's comfort goal  Outcome: PROGRESSING AS EXPECTED

## 2019-11-21 NOTE — CARE PLAN
Problem: Altered physiologic condition related to immediate post-delivery state and potential for bleeding/hemorrhage  Goal: Patient physiologically stable as evidenced by normal lochia, palpable uterine involution and vital signs within normal limits  Intervention: Massage fundus as necessary to prevent excessive lochia  Note:   Fundal massaged one with light bleeding

## 2019-11-21 NOTE — CARE PLAN
Problem: Alteration in comfort related to episiotomy, vaginal repair and/or after birth pains  Goal: Patient is able to ambulate, care for self and infant  Intervention: Administer pain meds as requested by patient and ordered by MD/DO/CNM  Note:   Pain medicine given as requested

## 2019-11-21 NOTE — PROGRESS NOTES
Hospital Day : 1    S: doing well; alejandra diet; min bleed; bfeed    O:  Vitals:    19 0950 19 1400 19 1800 19 0600   BP: 127/66 122/78 120/78 111/66   Pulse: 73 69 74 62   Resp:     Temp:  37.2 °C (98.9 °F) 37.1 °C (98.8 °F) 36.7 °C (98 °F)   TempSrc:  Temporal Temporal Temporal   SpO2:  95% 92% 96%   Weight:       Height:           Recent Labs     19  0300 19  1656   WBC 11.2* 11.5*   RBC 4.42 3.85*   HEMOGLOBIN 14.2 12.6   HEMATOCRIT 41.7 36.4*   MCV 94.3 94.5   MCH 32.1 32.7   MCHC 34.1 34.6   RDW 45.6 45.4   PLATELETCT 178 137*   MPV 10.7 10.3     Recent Labs     19  0300   SODIUM 136   POTASSIUM 3.7   CHLORIDE 103   CO2 23   GLUCOSE 90   BUN 13   CREATININE 0.79   CALCIUM 9.2         abd soft ff    A: pp1 sp  doing well    P: dc

## 2019-11-21 NOTE — LACTATION NOTE
This note was copied from a baby's chart.  BREASTFEEDING DISCHARGE INSTRUCTIONS   Mom P3 who breast fed other two children for 3 months. Baby weighed 5 # 15.3 oz and was born . Baby has had poor feedings and with LC's visit this morning, baby was too sleepy at breast and mom reports last feeding attempt was 0400. last feeding was a short time after midnight. Baby exhibits a disorganized suckle pattern and poor suckle reflex. BS was done by bedside RN at 10:45 and was 48.   set up breast pump settings to 80/30 and 60/30 after 3-4 minutes of pumping. Reviewed cleaning instructions as well as storing colostrum. Mom has a 3 step feeding plan which is : breastfeed 8 or more times in 24 hours offering both breasts but not letting baby stay longer than approx 10-15 minutes each breast of active feeding pattern. Mom or FOB will then supplement 10-15 mls after every feed of DBM while in hospital and formula after discharge. Then mom will  pump 15 minutes bilaterally and store for next feeding.   recommended that mom stay overnight and work on establishing breastfeeding. However if parents decide they want to go home then follow up with pediatrician is encouraged tomorrow and  [place] on Saturday for  follow up.  Mom has handout for lactation resources outpatient. Mom verbalizes understanding of importance of lactation follow up to be successful.  Teaching Provided  Hand Expression Taught: (demonstrated hand expression, gave video information for mom to view expression video while STS)  Latch-on Techniques Taught: (assisted with latch, draw baby deeply on when noting wide mouth)  Positioning Techniques Taught: (cross cradle hold recommmended for better control of head and body)  Pumping Taught: (bedside RN augustin set up pump and cleaning instructions for mom, pump after every feeding for 15 minutes bilaterally)  Recognizing Feeding Cues Taught: (observe for licking lips, hands to mouth, rooting towards  breast)  Supplementation Taught: (mom was given supplemental guidelines for volumes appropriate, DBM requested and mom will start with 15 mls. BS to be done by bedside RN)

## 2019-11-22 VITALS
WEIGHT: 170 LBS | SYSTOLIC BLOOD PRESSURE: 118 MMHG | OXYGEN SATURATION: 96 % | BODY MASS INDEX: 30.12 KG/M2 | RESPIRATION RATE: 18 BRPM | HEIGHT: 63 IN | HEART RATE: 57 BPM | DIASTOLIC BLOOD PRESSURE: 81 MMHG | TEMPERATURE: 98.2 F

## 2019-11-22 PROCEDURE — 700102 HCHG RX REV CODE 250 W/ 637 OVERRIDE(OP): Performed by: OBSTETRICS & GYNECOLOGY

## 2019-11-22 PROCEDURE — A9270 NON-COVERED ITEM OR SERVICE: HCPCS | Performed by: OBSTETRICS & GYNECOLOGY

## 2019-11-22 RX ADMIN — VITAMIN A, VITAMIN C, VITAMIN D-3, VITAMIN E, VITAMIN B-1, VITAMIN B-2, NIACIN, VITAMIN B-6, CALCIUM, IRON, ZINC, COPPER 1 TABLET: 4000; 120; 400; 22; 1.84; 3; 20; 10; 1; 12; 200; 27; 25; 2 TABLET ORAL at 06:18

## 2019-11-22 NOTE — LACTATION NOTE
"This note was copied from a baby's chart.  POB were getting ready for discharge home when LC arrived, mother reports baby breastfeeds better \"at times\" but at other times is sleepy and does not latch well or for an extended period of time, states baby is taking milk better via bottle, she states baby has been able to take 10  ml via bottle without problem, encouraged to continue supplementing and pumping in addition to breastfeeding until breastfeeding and milk supply are better established, mother states they have pediatrician appointment scheduled for Monday, encouraged to call to schedule 1:1 lactation consult for around the same time for assessment of feeding/pumping plan, mother states she received written information on outpatient breastfeeding assistance available from staff prior to this shift, discussed attributes of a nutritive compared to non-nutritive suck    Plan:  Q 3 hours (more often if feeding cues noted) attempt to breastfeed  Pump for 15 minutes and supplement per guidelines provided    Supplement guidelines provided and explained    Encouraged to call for assistance as needed    "

## 2019-11-22 NOTE — PROGRESS NOTES
Patient has elected to stay in the hospital for another night for extra support feeding infant.  See verbal order for cancel discharge from Dr. Ashby.

## 2019-11-22 NOTE — DISCHARGE SUMMARY
Obstetrics Discharge Summary    Admission Date: 2019         Discharge Date: 2019    ADMISSION DIAGNOSIS:  1. Term intrauterine pregnancy at 39 weeks  2. Spontaneous rupture of membranes   3. Advanced maternal age    Discharge Diagnosis: same as above    DETAILS OF HOSPITAL STAY  Presenting Problem/History of Present Illness: loss of fluid    Hospital Course:  Patient is a 39 y.o. , who presented to [facility] at 39w0d with a chief complaint of loss of fluid. She had prenatal care with [facility] with Dr. Hill. Pregnancy was complicated by AMA. For full details of the delivery please refer to the delivery note dictation. Briefly, the patient underwent an uncomplicated normal spontaneous vaginal delivery. There were no complications. Estimated blood loss was 200 ml. The patient delivered a viable female infant weighing 2,695g with Apgars as below in delivery summary. Patient’s recovery and postpartum course were unremarkable. By postpartum day 2, patient met all appropriate milestones and was stable to be discharged to home.    PHYSICAL EXAM:  Vitals:    19 0600   BP: 118/81   Pulse: (!) 57   Resp: 18   Temp: 36.8 °C (98.2 °F)   SpO2: 96%     General: alert, conversational, pleasant  CVS: Regular rate  Pulm: No respiratory distress, symmetric expansion  Abd: Soft, non-tender, fundus firm  : deferred  Extremities: no edema, moves all     APGARs:   8    9       COMPLICATIONS: none    LABS/STUDIES:   Results from last 7 days   Lab Units 19  1656 19  0300   WBC 1501 K/uL 11.5* 11.2*     Lab Results   Component Value Date/Time    WBC 11.5 (H) 2019 1656    CREATININE 0.79 2019 0300    BUN 13 2019 0300    CO2 23 2019 0300     Recent Results (from the past 72 hour(s))   Hold Blood Bank Specimen (Not Tested)    Collection Time: 19  3:00 AM   Result Value Ref Range    Holding Tube - Bb DONE    CBC WITH DIFFERENTIAL    Collection  Time: 11/20/19  3:00 AM   Result Value Ref Range    WBC 11.2 (H) 4.8 - 10.8 K/uL    RBC 4.42 4.20 - 5.40 M/uL    Hemoglobin 14.2 12.0 - 16.0 g/dL    Hematocrit 41.7 37.0 - 47.0 %    MCV 94.3 81.4 - 97.8 fL    MCH 32.1 27.0 - 33.0 pg    MCHC 34.1 33.6 - 35.0 g/dL    RDW 45.6 35.9 - 50.0 fL    Platelet Count 178 164 - 446 K/uL    MPV 10.7 9.0 - 12.9 fL    Neutrophils-Polys 69.90 44.00 - 72.00 %    Lymphocytes 22.00 22.00 - 41.00 %    Monocytes 6.50 0.00 - 13.40 %    Eosinophils 0.70 0.00 - 6.90 %    Basophils 0.30 0.00 - 1.80 %    Immature Granulocytes 0.60 0.00 - 0.90 %    Nucleated RBC 0.00 /100 WBC    Neutrophils (Absolute) 7.83 (H) 2.00 - 7.15 K/uL    Lymphs (Absolute) 2.47 1.00 - 4.80 K/uL    Monos (Absolute) 0.73 0.00 - 0.85 K/uL    Eos (Absolute) 0.08 0.00 - 0.51 K/uL    Baso (Absolute) 0.03 0.00 - 0.12 K/uL    Immature Granulocytes (abs) 0.07 0.00 - 0.11 K/uL    NRBC (Absolute) 0.00 K/uL   Comp Metabolic Panel    Collection Time: 11/20/19  3:00 AM   Result Value Ref Range    Sodium 136 135 - 145 mmol/L    Potassium 3.7 3.6 - 5.5 mmol/L    Chloride 103 96 - 112 mmol/L    Co2 23 20 - 33 mmol/L    Anion Gap 10.0 0.0 - 11.9    Glucose 90 65 - 99 mg/dL    Bun 13 8 - 22 mg/dL    Creatinine 0.79 0.50 - 1.40 mg/dL    Calcium 9.2 8.5 - 10.5 mg/dL    AST(SGOT) 21 12 - 45 U/L    ALT(SGPT) 18 2 - 50 U/L    Alkaline Phosphatase 113 (H) 30 - 99 U/L    Total Bilirubin 0.4 0.1 - 1.5 mg/dL    Albumin 3.8 3.2 - 4.9 g/dL    Total Protein 6.7 6.0 - 8.2 g/dL    Globulin 2.9 1.9 - 3.5 g/dL    A-G Ratio 1.3 g/dL   ESTIMATED GFR    Collection Time: 11/20/19  3:00 AM   Result Value Ref Range    GFR If African American >60 >60 mL/min/1.73 m 2    GFR If Non African American >60 >60 mL/min/1.73 m 2   CBC without differential    Collection Time: 11/20/19  4:56 PM   Result Value Ref Range    WBC 11.5 (H) 4.8 - 10.8 K/uL    RBC 3.85 (L) 4.20 - 5.40 M/uL    Hemoglobin 12.6 12.0 - 16.0 g/dL    Hematocrit 36.4 (L) 37.0 - 47.0 %    MCV 94.5  81.4 - 97.8 fL    MCH 32.7 27.0 - 33.0 pg    MCHC 34.6 33.6 - 35.0 g/dL    RDW 45.4 35.9 - 50.0 fL    Platelet Count 137 (L) 164 - 446 K/uL    MPV 10.3 9.0 - 12.9 fL     DISPOSITION: Home.    DISCHARGE MEDICATIONS:   Ann Lugo   Home Medication Instructions RIAN:[id number]    Printed on:11/22/19 5580   Medication Information                      ibuprofen (MOTRIN) 600 MG Tab  Take 1 Tab by mouth every 6 hours as needed (For cramping after delivery; do not give if patient is receiving ketorolac (Toradol)).               DISCHARGE INSTRUCTIONS:  1. Pelvic rest for 6 weeks postpartum.   2. [facility]partum visit in 6 weeks at [facility] [phone].  3. Return to the emergency department if experiencing increased vaginal bleeding, severe pain, temperature greater than 100.4, or any other concerns.    DISCHARGE CONDITION: Stable.    Zoraida Ashby M.D.

## 2019-11-22 NOTE — CARE PLAN
Problem: Potential knowledge deficit related to lack of understanding of self and  care  Goal: Patient will verbalize understanding of self and infant care  Outcome: PROGRESSING AS EXPECTED  Goal: Patient will demonstrate ability to care for self and infant  Outcome: PROGRESSING AS EXPECTED     Problem: Altered physiologic condition related to immediate post-delivery state and potential for bleeding/hemorrhage  Goal: Patient physiologically stable as evidenced by normal lochia, palpable uterine involution and vital signs within normal limits  Outcome: PROGRESSING AS EXPECTED

## 2019-11-22 NOTE — PROGRESS NOTES
Assumed care. Assessment completed, fundus firm, lochia light. POC reviewed, verbalized understanding. Denies pain at this time, will call if pain med intervention needed.   Agrees with plan to discharge home today, feels more confident feeding infant after staying in the hospital through the night.

## 2019-11-22 NOTE — PROGRESS NOTES
Discharge information re-reviewed with parents. No questions or concerns at this time.  Agree to attend all follow up appointments and continue 3-step feeding with baby until instructed otherwise by pediatrician.  Cuddles removed, bands checked, parents will call for car seat check when ready.

## 2021-03-25 NOTE — PROGRESS NOTES
Pt arrive to [facility], pt is a&o, pain controlled and no distress noted.  Oriented to rm, flr, and updated with plan of care.  Bands and cuddle verified.   ambulatory

## 2021-04-09 ENCOUNTER — HOSPITAL ENCOUNTER (OUTPATIENT)
Dept: RADIOLOGY | Facility: MEDICAL CENTER | Age: 41
End: 2021-04-09
Attending: OBSTETRICS & GYNECOLOGY
Payer: COMMERCIAL

## 2021-04-09 DIAGNOSIS — Z12.31 VISIT FOR SCREENING MAMMOGRAM: ICD-10-CM

## 2021-04-09 PROCEDURE — 77063 BREAST TOMOSYNTHESIS BI: CPT

## 2022-03-18 ENCOUNTER — OFFICE VISIT (OUTPATIENT)
Dept: URGENT CARE | Facility: CLINIC | Age: 42
End: 2022-03-18
Payer: COMMERCIAL

## 2022-03-18 VITALS
OXYGEN SATURATION: 96 % | RESPIRATION RATE: 20 BRPM | HEIGHT: 62 IN | BODY MASS INDEX: 27.23 KG/M2 | DIASTOLIC BLOOD PRESSURE: 68 MMHG | HEART RATE: 87 BPM | SYSTOLIC BLOOD PRESSURE: 122 MMHG | TEMPERATURE: 99 F | WEIGHT: 148 LBS

## 2022-03-18 DIAGNOSIS — R21 RASH IN ADULT: ICD-10-CM

## 2022-03-18 PROCEDURE — 99202 OFFICE O/P NEW SF 15 MIN: CPT | Performed by: NURSE PRACTITIONER

## 2022-03-24 NOTE — PROGRESS NOTES
"Subjective     Ann Lugo is a 41 y.o. female who presents with Rash (X today, back area)            Rash  This is a new problem. Episode onset: pt reports new onset of fever, malaise and a small rash that presented yesterday. She states her  had shingles about 3 weeks ago. She admits she has no immunity to chicken pox or shingles. Tmax 100.4F.  Location: currently rash is present to her trunk, 4-5 small rash pustules. The rash is characterized by blistering. Past treatments include nothing.       Review of Systems   Skin: Positive for rash. Negative for itching.   All other systems reviewed and are negative.         History reviewed. No pertinent past medical history. History reviewed. No pertinent surgical history.   Social History     Socioeconomic History   • Marital status:      Spouse name: Not on file   • Number of children: Not on file   • Years of education: Not on file   • Highest education level: Not on file   Occupational History   • Not on file   Tobacco Use   • Smoking status: Never Smoker   • Smokeless tobacco: Never Used   Substance and Sexual Activity   • Alcohol use: No     Comment: occass   • Drug use: No   • Sexual activity: Not on file   Other Topics Concern   • Not on file   Social History Narrative   • Not on file     Social Determinants of Health     Financial Resource Strain: Not on file   Food Insecurity: Not on file   Transportation Needs: Not on file   Physical Activity: Not on file   Stress: Not on file   Social Connections: Not on file   Intimate Partner Violence: Not on file   Housing Stability: Not on file         Objective     /68 (BP Location: Right arm, Patient Position: Sitting, BP Cuff Size: Adult long)   Pulse 87   Temp 37.2 °C (99 °F) (Temporal)   Resp 20   Ht 1.575 m (5' 2\")   Wt 67.1 kg (148 lb)   SpO2 96%   BMI 27.07 kg/m²      Physical Exam  Vitals and nursing note reviewed.   Constitutional:       Appearance: Normal appearance. She is " normal weight.   HENT:      Head: Normocephalic and atraumatic.      Nose: Nose normal.      Mouth/Throat:      Mouth: Mucous membranes are moist.      Pharynx: Oropharynx is clear.   Eyes:      Extraocular Movements: Extraocular movements intact.      Pupils: Pupils are equal, round, and reactive to light.   Cardiovascular:      Rate and Rhythm: Normal rate and regular rhythm.   Pulmonary:      Effort: Pulmonary effort is normal.   Chest:       Musculoskeletal:         General: Normal range of motion.      Cervical back: Normal range of motion.   Skin:     General: Skin is warm and dry.      Capillary Refill: Capillary refill takes less than 2 seconds.   Neurological:      General: No focal deficit present.      Mental Status: She is alert and oriented to person, place, and time. Mental status is at baseline.   Psychiatric:         Mood and Affect: Mood normal.         Speech: Speech normal.         Thought Content: Thought content normal.         Judgment: Judgment normal.                             Assessment & Plan        1. Rash in adult    Discussed with patient if this is in fact varicella zoster, the rash will only continue to get worse over the coming days  There is no treatment for chicken pox, only supportive care  She V/U  If she does break out in a rash, she can be contagious to others and will need to quarantine until rash resolves  Supportive care, differential diagnoses, and indications for immediate follow-up discussed with patient.    Pathogenesis of diagnosis discussed including typical length and natural progression.      Instructed to return to  or nearest emergency department if symptoms fail to improve, for any change in condition, further concerns, or new concerning symptoms.  Patient states understanding of the plan of care and discharge instructions.

## 2022-05-05 ENCOUNTER — HOSPITAL ENCOUNTER (OUTPATIENT)
Dept: RADIOLOGY | Facility: MEDICAL CENTER | Age: 42
End: 2022-05-05
Attending: INTERNAL MEDICINE
Payer: COMMERCIAL

## 2022-05-05 DIAGNOSIS — Z12.31 VISIT FOR SCREENING MAMMOGRAM: ICD-10-CM

## 2022-05-05 PROCEDURE — 77063 BREAST TOMOSYNTHESIS BI: CPT
